# Patient Record
Sex: FEMALE
[De-identification: names, ages, dates, MRNs, and addresses within clinical notes are randomized per-mention and may not be internally consistent; named-entity substitution may affect disease eponyms.]

---

## 2021-04-16 ENCOUNTER — NURSE TRIAGE (OUTPATIENT)
Dept: OTHER | Facility: CLINIC | Age: 21
End: 2021-04-16

## 2021-04-16 NOTE — TELEPHONE ENCOUNTER
Brief description of triage: Mother of patient calling with patient present, two episodes of diarrhea and \"feeling yucky\" denies fever, abdominal pain, known sick contacts. Does report seeing small amount of blood in the stool however could not tell if it was from starting menses or rectal bleeding. Triage indicates for patient to see PCP within 24hrs, office will likely be closed over the weekend so recommend [facility] tomorrow for evaluation. Care advise given, drink more fluids, start off with bland diet, maybe a few crackers and advance as tolerated avoiding spicy, greasy, and dairy for a few days. Call back with worsening of condition or other concerns including increased bleeding, abdominal pain, not tolerating PO, dizziness, etc.    Care advice provided, patient verbalizes understanding; denies any other questions or concerns; instructed to call back for any new or worsening symptoms. This triage is a result of a call to [address] [place] [address]. Please do not respond to the triage nurse through this encounter. Any subsequent communication should be directly with the patient. Reason for Disposition   [1] Blood in the stool AND [2] small amount of blood   (Exception: only on toilet paper. Reason: diarrhea can cause rectal irritation with blood on wiping)    Answer Assessment - Initial Assessment Questions  1. DIARRHEA SEVERITY: \"How bad is the diarrhea? \" \"How many extra stools have you had in the past 24 hours than normal?\"     - NO DIARRHEA (SCALE 0)    - MILD (SCALE 1-3): Few loose or mushy BMs; increase of 1-3 stools over normal daily number of stools; mild increase in ostomy output. -  MODERATE (SCALE 4-7): Increase of 4-6 stools daily over normal; moderate increase in ostomy output. * SEVERE (SCALE 8-10; OR 'WORST POSSIBLE'): Increase of 7 or more stools daily over normal; moderate increase in ostomy output; incontinence. Twice    2. ONSET: \"When did the diarrhea begin? \"       1 hour ago    3. BM CONSISTENCY: \"How loose or watery is the diarrhea? \"       Soft    4. VOMITING: \"Are you also vomiting? \" If so, ask: \"How many times in the past 24 hours? \"       None    5. ABDOMINAL PAIN: Pam Medeiros you having any abdominal pain? \" If yes: \"What does it feel like? \" (e.g., crampy, dull, intermittent, constant)       \"feels yucky\"    6. ABDOMINAL PAIN SEVERITY: If present, ask: \"How bad is the pain? \"  (e.g., Scale 1-10; mild, moderate, or severe)    - MILD (1-3): doesn't interfere with normal activities, abdomen soft and not tender to touch     - MODERATE (4-7): interferes with normal activities or awakens from sleep, tender to touch     - SEVERE (8-10): excruciating pain, doubled over, unable to do any normal activities        No    7. ORAL INTAKE: If vomiting, \"Have you been able to drink liquids? \" \"How much fluids have you had in the past 24 hours? \"      Yes, drinking water now    8. HYDRATION: \"Any signs of dehydration? \" (e.g., dry mouth [not just dry lips], too weak to stand, dizziness, new weight loss) \"When did you last urinate? \"      No dizziness, did lay down when she felt weak, arms felt weak    9. EXPOSURE: \"Have you traveled to a foreign country recently? \" \"Have you been exposed to anyone with diarrhea? \" \"Could you have eaten any food that was spoiled? \"      Unsure    10. ANTIBIOTIC USE: \"Are you taking antibiotics now or have you taken antibiotics in the past 2 months? \"        No    11. OTHER SYMPTOMS: \"Do you have any other symptoms? \" (e.g., fever, blood in stool)        Noted some blood, suspect could be starting period    12. PREGNANCY: \"Is there any chance you are pregnant? \" \"When was your last menstrual period? \"        n/a    Protocols used: [id number]

## 2023-08-27 PROBLEM — R31.9 HEMATURIA: Status: ACTIVE | Noted: 2023-08-27

## 2023-08-27 PROBLEM — N20.0 CALCIUM OXALATE STONE IN URINE: Status: ACTIVE | Noted: 2023-08-27

## 2023-08-27 PROBLEM — R82.992 HYPEROXALURIA: Status: ACTIVE | Noted: 2023-08-27

## 2023-08-27 PROBLEM — N92.1 EXCESSIVE AND FREQUENT MENSTRUATION WITH IRREGULAR CYCLE: Status: ACTIVE | Noted: 2023-08-27

## 2023-10-30 NOTE — PROGRESS NOTES
Annual  Subjective   Christina Champagne is a 23 y.o. female who is here for a routine exam.     Complaints:***  Periods: regular***  Dysmenorrhea: none***    Current contraception: OCP  History of abnormal Pap smear: no  History of abnormal mammogram: no      OB History    No obstetric history on file.          Review of Systems    Objective   There were no vitals taken for this visit.       General:   Alert and oriented, in no acute distress   Neck: Supple. No visible thyromegaly.    Breast/Axilla: Normal to palpation bilaterally without masses, skin changes, or nipple discharge.    Abdomen: Soft, non-tender, without masses or organomegaly   Vulva: Normal architecture without erythema, masses, or lesions.    Vagina: Normal mucosa without lesions, masses, or atrophy. No abnormal vaginal discharge.    Cervix: Normal without masses, lesions, or signs of cervicitis   Uterus: Normal, mobile, non-enlarged uterus   Adnexa: Normal without masses or lesions   Pelvic Floor normal   Psych Normal affect. Normal mood.      Assessment/Plan   {Assess/Plan SmartLinks (Optional):49919}    Routine annual    Pap due***  Mammogram    Ekta Butcher MD

## 2023-11-01 ENCOUNTER — APPOINTMENT (OUTPATIENT)
Dept: OBSTETRICS AND GYNECOLOGY | Facility: CLINIC | Age: 23
End: 2023-11-01
Payer: COMMERCIAL